# Patient Record
Sex: MALE | Race: WHITE
[De-identification: names, ages, dates, MRNs, and addresses within clinical notes are randomized per-mention and may not be internally consistent; named-entity substitution may affect disease eponyms.]

---

## 2019-09-09 ENCOUNTER — HOSPITAL ENCOUNTER (EMERGENCY)
Dept: HOSPITAL 56 - MW.ED | Age: 34
Discharge: HOME | End: 2019-09-09
Payer: COMMERCIAL

## 2019-09-09 DIAGNOSIS — R42: Primary | ICD-10-CM

## 2019-09-09 LAB
BUN SERPL-MCNC: 12 MG/DL (ref 7–18)
CHLORIDE SERPL-SCNC: 104 MMOL/L (ref 98–107)
CO2 SERPL-SCNC: 28.3 MMOL/L (ref 21–32)
GLUCOSE SERPL-MCNC: 149 MG/DL (ref 74–106)
POTASSIUM SERPL-SCNC: 4 MMOL/L (ref 3.5–5.1)
SODIUM SERPL-SCNC: 143 MMOL/L (ref 136–148)

## 2019-09-09 PROCEDURE — 70450 CT HEAD/BRAIN W/O DYE: CPT

## 2019-09-09 PROCEDURE — 96360 HYDRATION IV INFUSION INIT: CPT

## 2019-09-09 PROCEDURE — 99284 EMERGENCY DEPT VISIT MOD MDM: CPT

## 2019-09-09 PROCEDURE — 81003 URINALYSIS AUTO W/O SCOPE: CPT

## 2019-09-09 PROCEDURE — 85025 COMPLETE CBC W/AUTO DIFF WBC: CPT

## 2019-09-09 PROCEDURE — 84443 ASSAY THYROID STIM HORMONE: CPT

## 2019-09-09 PROCEDURE — 36415 COLL VENOUS BLD VENIPUNCTURE: CPT

## 2019-09-09 PROCEDURE — 93005 ELECTROCARDIOGRAM TRACING: CPT

## 2019-09-09 PROCEDURE — 80305 DRUG TEST PRSMV DIR OPT OBS: CPT

## 2019-09-09 PROCEDURE — 80053 COMPREHEN METABOLIC PANEL: CPT

## 2019-09-09 NOTE — EDM.PDOC
ED HPI GENERAL MEDICAL PROBLEM





- General


Stated Complaint: DIZZINESS


Time Seen by Provider: 09/09/19 15:23


Source of Information: Reports: Patient


History Limitations: Reports: No Limitations





- History of Present Illness


INITIAL COMMENTS - FREE TEXT/NARRATIVE: 


HISTORY AND PHYSICAL:





History of present illness:


Patient is a 34-year-old male who presents to the emergency room today with 

complaints of dizziness, ringing in his ears and sinus pressure. He states over 

the past 1-2 months he has had intermittent episodes of dizziness which he 

believes does seem to improve with eating. He also has noticed intermittent 

ringing in his ears, eating he did have tubes previously as a child and is 

concerned he may have ear infection. Patient does also note he has been taking 

Kratum (OTC pain medication) on a routine basis "approximately 10 mg daily". He 

did not have any yesterday and thought maybe he was "withdrawing" and did take 

1 tablet this morning. He denies any other over the counter medication usage or 

drug abuse.





Review of systems: 


As per history of present illness and below otherwise all systems reviewed and 

negative.





Past medical history: 


As per history of present illness and as reviewed below otherwise 

noncontributory.





Surgical history: 


As per history of present illness and as reviewed below otherwise 

noncontributory.





Social history: 


See social history for further information





Family history: 


As per history of present illness and as reviewed below otherwise 

noncontributory.





Physical exam:


General: Well-developed and well-nourished 34-year-old male. Alert and 

oriented. Nontoxic appearing and in no acute distress.


HEENT: Atraumatic, normocephalic, pupils equal and reactive bilaterally, 

negative for conjunctival pallor or scleral icterus, mucous membranes moist, 

TMs normal bilaterally, throat clear, neck supple, nontender, trachea midline. 

No drooling or trismus noted. No meningeal signs. No hot potato voice noted. 


Lungs: Clear to auscultation, breath sounds equal bilaterally, chest nontender.


Heart: S1S2, regular rate and rhythm without overt murmur


Abdomen: Soft, nondistended, nontender. Negative for masses or 

hepatosplenomegaly. Negative for costovertebral tenderness.


Pelvis: Stable nontender.


Skin: Intact, warm, dry. No lesions or rashes noted.


Extremities: Atraumatic, moves all extremities per self without difficulty or 

deficits, negative for cords or calf pain. Neurovascular unremarkable.


Neuro: Awake, alert, oriented. Cranial nerves II through XII unremarkable. 

Cerebellum unremarkable. Motor and sensory unremarkable throughout. Exam 

nonfocal.





Notes:


Lab work, CT and EKG are within normal limits. No significant changes with 

orthostatic vital signs. Patient does feel some improvement after IV fluids and 

meclizine. Supportive care measures were reviewed and discussed. Voices 

understanding and is agreeable to plan of care. Denies any further questions or 

concerns at this time.





Diagnostics:


CBC, CMP, EKG, head CT, orthostatic vital signs





Therapeutics:


IV fluid, meclizine





Prescription:


None





Impression: 


Dizziness





Plan:


1. You may use over-the-counter meclizine for dizziness. Please increase your 

oral fluids.


2. Please follow-up with ear nose and throat specialist and/or your primary 

care provider as we discussed. Return to the ED as needed and as discussed.





Definitive disposition and diagnosis as appropriate pending reevaluation and 

review of above.








- Related Data


 Allergies











Allergy/AdvReac Type Severity Reaction Status Date / Time


 


No Known Allergies Allergy   Verified 09/09/19 15:30











Home Meds: 


 Home Meds





. [No Known Home Meds]  09/09/19 [History]











Past Medical History


HEENT History: Reports: Otitis Media


Cardiovascular History: Reports: None


Respiratory History: Reports: None


Gastrointestinal History: Reports: None


Genitourinary History: Reports: None


Musculoskeletal History: Reports: Back Pain, Chronic


Neurological History: Reports: Vertigo


Psychiatric History: Reports: None


Endocrine/Metabolic History: Reports: None


Hematologic History: Reports: None


Immunologic History: Reports: None


Oncologic (Cancer) History: Reports: None


Dermatologic History: Reports: None





- Infectious Disease History


Infectious Disease History: Reports: Chicken Pox





- Past Surgical History


Head Surgeries/Procedures: Reports: None


HEENT Surgical History: Reports: Myringotomy w Tube(s)


Cardiovascular Surgical History: Reports: None


Respiratory Surgical History: Reports: None


GI Surgical History: Reports: None


Male  Surgical History: Reports: None


Endocrine Surgical History: Reports: None


Neurological Surgical History: Reports: None


Musculoskeletal Surgical History: Reports: None


Oncologic Surgical History: Reports: None


Dermatological Surgical History: Reports: None





Social & Family History





- Family History


Family Medical History: Noncontributory





- Tobacco Use


Smoking Status *Q: Never Smoker


Second Hand Smoke Exposure: No





- Caffeine Use


Caffeine Use: Reports: Coffee, Energy Drinks, Soda





- Recreational Drug Use


Recreational Drug Use: No





ED ROS GENERAL





- Review of Systems


Review Of Systems: ROS reveals no pertinent complaints other than HPI.





ED EXAM, GENERAL





- Physical Exam


Exam: See Below (See dictation)





Course





- Vital Signs


Last Recorded V/S: 


 Last Vital Signs











Temp  97.9 F   09/09/19 15:30


 


Pulse  91   09/09/19 15:30


 


Resp  18   09/09/19 15:30


 


BP  147/66 H  09/09/19 15:30


 


Pulse Ox  98   09/09/19 15:30








 





Orthostatic Blood Pressure [     113/61


Standing]                        


Orthostatic Blood Pressure [     112/67


Sitting]                         


Orthostatic Blood Pressure [     125/61


Supine]                          











- Orders/Labs/Meds


Orders: 


 Active Orders 24 hr











 Category Date Time Status


 


 EKG Documentation Completion [RC] STAT Care  09/09/19 15:45 Active


 


 Orthostatic Vital Signs [RC] ASDIRECTED Care  09/09/19 15:38 Active











Labs: 


 Laboratory Tests











  09/09/19 09/09/19 09/09/19 Range/Units





  15:50 15:50 15:50 


 


WBC  8.38    (4.0-11.0)  K/uL


 


RBC  5.01    (4.50-5.90)  M/uL


 


Hgb  15.5    (13.0-17.0)  g/dL


 


Hct  45.0    (38.0-50.0)  %


 


MCV  89.8    (80.0-98.0)  fL


 


MCH  30.9    (27.0-32.0)  pg


 


MCHC  34.4    (31.0-37.0)  g/dL


 


RDW Std Deviation  45.0    (28.0-62.0)  fl


 


RDW Coeff of Allan  14    (11.0-15.0)  %


 


Plt Count  228    (150-400)  K/uL


 


MPV  9.00    (7.40-12.00)  fL


 


Neut % (Auto)  85.2 H    (48.0-80.0)  %


 


Lymph % (Auto)  9.7 L    (16.0-40.0)  %


 


Mono % (Auto)  4.8    (0.0-15.0)  %


 


Eos % (Auto)  0.2    (0.0-7.0)  %


 


Baso % (Auto)  0.1    (0.0-1.5)  %


 


Neut # (Auto)  7.1 H    (1.4-5.7)  K/uL


 


Lymph # (Auto)  0.8    (0.6-2.4)  K/uL


 


Mono # (Auto)  0.4    (0.0-0.8)  K/uL


 


Eos # (Auto)  0.0    (0.0-0.7)  K/uL


 


Baso # (Auto)  0.0    (0.0-0.1)  K/uL


 


Nucleated RBC %  0.0    /100WBC


 


Nucleated RBCs #  0    K/uL


 


Sodium   143   (136-148)  mmol/L


 


Potassium   4.0   (3.5-5.1)  mmol/L


 


Chloride   104   ()  mmol/L


 


Carbon Dioxide   28.3   (21.0-32.0)  mmol/L


 


BUN   12   (7.0-18.0)  mg/dL


 


Creatinine   1.3   (0.8-1.3)  mg/dL


 


Est Cr Clr Drug Dosing   84.23   mL/min


 


Estimated GFR (MDRD)   > 60.0   ml/min


 


Glucose   149 H   ()  mg/dL


 


Calcium   9.3   (8.5-10.1)  mg/dL


 


Total Bilirubin   0.3   (0.2-1.0)  mg/dL


 


AST   18   (15-37)  IU/L


 


ALT   38   (14-63)  IU/L


 


Alkaline Phosphatase   55   ()  U/L


 


Total Protein   7.0   (6.4-8.2)  g/dL


 


Albumin   3.9   (3.4-5.0)  g/dL


 


Globulin   3.1   (2.6-4.0)  g/dL


 


Albumin/Globulin Ratio   1.3   (0.9-1.6)  


 


TSH 3rd Generation    1.92  (0.36-3.74)  uIU/mL


 


Urine Color     


 


Urine Appearance     


 


Urine pH     (5.0-8.0)  


 


Ur Specific Gravity     (1.001-1.035)  


 


Urine Protein     (NEGATIVE)  mg/dL


 


Urine Glucose (UA)     (NEGATIVE)  mg/dL


 


Urine Ketones     (NEGATIVE)  mg/dL


 


Urine Occult Blood     (NEGATIVE)  


 


Urine Nitrite     (NEGATIVE)  


 


Urine Bilirubin     (NEGATIVE)  


 


Urine Urobilinogen     (<2.0)  EU/dL


 


Ur Leukocyte Esterase     (NEGATIVE)  


 


Urine Opiates Screen     (NEGATIVE)  


 


Ur Oxycodone Screen     (NEGATIVE)  


 


Urine Methadone Screen     (NEGATIVE)  


 


Ur Barbiturates Screen     (NEGATIVE)  


 


Ur Phencyclidine Scrn     (NEGATIVE)  


 


Ur Amphetamine Screen     (NEGATIVE)  


 


U Methamphetamines Scrn     (NEGATIVE)  


 


U Benzodiazepines Scrn     (NEGATIVE)  


 


U Cocaine Metab Screen     (NEGATIVE)  


 


U Marijuana (THC) Screen     (NEGATIVE)  














  09/09/19 09/09/19 Range/Units





  16:08 16:08 


 


WBC    (4.0-11.0)  K/uL


 


RBC    (4.50-5.90)  M/uL


 


Hgb    (13.0-17.0)  g/dL


 


Hct    (38.0-50.0)  %


 


MCV    (80.0-98.0)  fL


 


MCH    (27.0-32.0)  pg


 


MCHC    (31.0-37.0)  g/dL


 


RDW Std Deviation    (28.0-62.0)  fl


 


RDW Coeff of Allan    (11.0-15.0)  %


 


Plt Count    (150-400)  K/uL


 


MPV    (7.40-12.00)  fL


 


Neut % (Auto)    (48.0-80.0)  %


 


Lymph % (Auto)    (16.0-40.0)  %


 


Mono % (Auto)    (0.0-15.0)  %


 


Eos % (Auto)    (0.0-7.0)  %


 


Baso % (Auto)    (0.0-1.5)  %


 


Neut # (Auto)    (1.4-5.7)  K/uL


 


Lymph # (Auto)    (0.6-2.4)  K/uL


 


Mono # (Auto)    (0.0-0.8)  K/uL


 


Eos # (Auto)    (0.0-0.7)  K/uL


 


Baso # (Auto)    (0.0-0.1)  K/uL


 


Nucleated RBC %    /100WBC


 


Nucleated RBCs #    K/uL


 


Sodium    (136-148)  mmol/L


 


Potassium    (3.5-5.1)  mmol/L


 


Chloride    ()  mmol/L


 


Carbon Dioxide    (21.0-32.0)  mmol/L


 


BUN    (7.0-18.0)  mg/dL


 


Creatinine    (0.8-1.3)  mg/dL


 


Est Cr Clr Drug Dosing    mL/min


 


Estimated GFR (MDRD)    ml/min


 


Glucose    ()  mg/dL


 


Calcium    (8.5-10.1)  mg/dL


 


Total Bilirubin    (0.2-1.0)  mg/dL


 


AST    (15-37)  IU/L


 


ALT    (14-63)  IU/L


 


Alkaline Phosphatase    ()  U/L


 


Total Protein    (6.4-8.2)  g/dL


 


Albumin    (3.4-5.0)  g/dL


 


Globulin    (2.6-4.0)  g/dL


 


Albumin/Globulin Ratio    (0.9-1.6)  


 


TSH 3rd Generation    (0.36-3.74)  uIU/mL


 


Urine Color  YELLOW   


 


Urine Appearance  CLEAR   


 


Urine pH  6.5   (5.0-8.0)  


 


Ur Specific Gravity  <= 1.005   (1.001-1.035)  


 


Urine Protein  NEGATIVE   (NEGATIVE)  mg/dL


 


Urine Glucose (UA)  NEGATIVE   (NEGATIVE)  mg/dL


 


Urine Ketones  NEGATIVE   (NEGATIVE)  mg/dL


 


Urine Occult Blood  NEGATIVE   (NEGATIVE)  


 


Urine Nitrite  NEGATIVE   (NEGATIVE)  


 


Urine Bilirubin  NEGATIVE   (NEGATIVE)  


 


Urine Urobilinogen  0.2   (<2.0)  EU/dL


 


Ur Leukocyte Esterase  NEGATIVE   (NEGATIVE)  


 


Urine Opiates Screen   NEGATIVE  (NEGATIVE)  


 


Ur Oxycodone Screen   NEGATIVE  (NEGATIVE)  


 


Urine Methadone Screen   NEGATIVE  (NEGATIVE)  


 


Ur Barbiturates Screen   NEGATIVE  (NEGATIVE)  


 


Ur Phencyclidine Scrn   NEGATIVE  (NEGATIVE)  


 


Ur Amphetamine Screen   NEGATIVE  (NEGATIVE)  


 


U Methamphetamines Scrn   NEGATIVE  (NEGATIVE)  


 


U Benzodiazepines Scrn   NEGATIVE  (NEGATIVE)  


 


U Cocaine Metab Screen   NEGATIVE  (NEGATIVE)  


 


U Marijuana (THC) Screen   NEGATIVE  (NEGATIVE)  











Meds: 


Medications














Discontinued Medications














Generic Name Dose Route Start Last Admin





  Trade Name Freq  PRN Reason Stop Dose Admin


 


Sodium Chloride  1,000 mls @ 999 mls/hr  09/09/19 15:38  09/09/19 15:49





  Normal Saline  IV  09/09/19 16:38  999 mls/hr





  STAT ONE   Administration





     





     





     





     


 


Meclizine HCl  25 mg  09/09/19 15:38  09/09/19 15:49





  Antivert  PO  09/09/19 15:39  25 mg





  ONETIME ONE   Administration





     





     





     





     














Departure





- Departure


Time of Disposition: 17:10


Disposition: Home, Self-Care 01


Clinical Impression: 


 Dizziness








- Discharge Information


Instructions:  Dizziness, Easy-to-Read


Referrals: 


PCP,None [Primary Care Provider] - 


Additional Instructions: 


The following information is given to patients seen in the emergency department 

who are being discharged to home. This information is to outline your options 

for follow-up care. We provide all patients seen in our emergency department 

with a follow-up referral.





The need for follow-up, as well as the timing and circumstances, are variable 

depending upon the specifics of your emergency department visit.





If you don't have a primary care physician on staff, we will provide you with a 

referral. We always advise you to contact your personal physician following an 

emergency department visit to inform them of the circumstance of the visit and 

for follow-up with them and/or the need for any referrals to a consulting 

specialist.





The emergency department will also refer you to a specialist when appropriate. 

This referral assures that you have the opportunity for follow-up care with a 

specialist. All of these measure are taken in an effort to provide you with 

optimal care, which includes your follow-up.





Under all circumstances we always encourage you to contact your private 

physician who remains a resource for coordinating your care. When calling for 

follow-up care, please make the office aware that this follow-up is from your 

recent emergency room visit. If for any reason you are refused follow-up, 

please contact the [facility] Emergency 

Department at [phone] and asked to speak to the emergency department 

charge nurse.





[facility]


Primary Care


[address] [place] [address]


Phone: [phone]


Fax: [phone]





[address] [address]


Phone: [phone]


Fax: [phone]





1. You may use over-the-counter meclizine for dizziness. Please increase your 

oral fluids.


2. Please follow-up with ear nose and throat specialist and/or your primary 

care provider as we discussed. Return to the ED as needed and as discussed.





- My Orders


Last 24 Hours: 


My Active Orders





09/09/19 15:38


Orthostatic Vital Signs [RC] ASDIRECTED 





09/09/19 15:45


EKG Documentation Completion [RC] STAT 














- Assessment/Plan


Last 24 Hours: 


My Active Orders





09/09/19 15:38


Orthostatic Vital Signs [RC] ASDIRECTED 





09/09/19 15:45


EKG Documentation Completion [RC] STAT

## 2019-09-09 NOTE — CT
Indication:



Dizziness/Ear ringing x1 year



Technique:



CT of the head without contrast. Coronal and sagittal reformats. Bone and 

soft tissue algorithms. 



Comparison:



None



Findings:



No acute intracranial hemorrhage or extra-axial collection. No evidence of 

acute cortical infarction. No mass effect or midline shift. Normal cerebral 

volume. The ventricles are normal in size, shape and contour. There is 

normal grey and white matter differentiation. Incidental tiny focus of 

hyperdensity in the right frontal corona radiata is nonspecific with no 

adjacent vasogenic edema or mass effect possibly representing dystrophic 

calcification of uncertain clinical significance (image 40 series 201). 



The orbital contents are normal.  Paranasal sinuses are well aerated. 

Mastoid air cells are clear. No calvarial fractures. No lytic or sclerotic 

osseous lesions within the calvarium or skull base. Scalp and other imaged 

soft tissue structures are normal. 



Impression:



1. No acute intracranial abnormality.



2. Incidental tiny focus of hyperdensity in the right frontal corona 

radiata is nonspecific with no adjacent vasogenic edema or mass effect 

possibly representing dystrophic calcification of uncertain clinical 

significance.



Please note that all CT scans at this facility use dose modulation, 

iterative reconstruction, and/or weight-based dosing when appropriate to 

reduce radiation dose to as low as reasonably achievable.



Dictated by Sixot Carpio MD @ Sep  9 2019  4:57PM



Signed by Dr. Sixto Carpio @ Sep  9 2019  5:03PM